# Patient Record
Sex: MALE | Race: WHITE | NOT HISPANIC OR LATINO | Employment: UNEMPLOYED | URBAN - METROPOLITAN AREA
[De-identification: names, ages, dates, MRNs, and addresses within clinical notes are randomized per-mention and may not be internally consistent; named-entity substitution may affect disease eponyms.]

---

## 2023-04-17 ENCOUNTER — TELEPHONE (OUTPATIENT)
Dept: PRIMARY CARE | Facility: CLINIC | Age: 58
End: 2023-04-17
Payer: MEDICAID

## 2023-04-17 DIAGNOSIS — J01.90 ACUTE NON-RECURRENT SINUSITIS, UNSPECIFIED LOCATION: Primary | ICD-10-CM

## 2023-04-17 RX ORDER — DOXYCYCLINE 100 MG/1
100 CAPSULE ORAL 2 TIMES DAILY
Qty: 14 CAPSULE | Refills: 0 | Status: SHIPPED | OUTPATIENT
Start: 2023-04-17 | End: 2023-04-24

## 2023-04-17 RX ORDER — AMOXICILLIN 875 MG/1
875 TABLET, FILM COATED ORAL 2 TIMES DAILY
Qty: 20 TABLET | Refills: 0 | Status: SHIPPED | OUTPATIENT
Start: 2023-04-17 | End: 2023-04-27

## 2023-04-17 NOTE — TELEPHONE ENCOUNTER
OK- I sent him in Doxycycline.  Please be sure he triple checks this.  Unfortunately I cannot see urgent care records anymore.

## 2023-04-17 NOTE — TELEPHONE ENCOUNTER
Patient was in the UC on Sunday for a sinus infection down stairs. Patient was prescribed an antibiotic and woke up with swollen lips. Can something else be called in for him?

## 2023-04-17 NOTE — TELEPHONE ENCOUNTER
Patient went to  prescription at the pharmacy and they had the same prescription and he is having an allergic reaction to that medication. Please call when new prescription is called into pharmacy - [facility].

## 2023-04-17 NOTE — TELEPHONE ENCOUNTER
Patient seen at  Urgent care 4/16 for sinus infection. States he was prescribed Doxycyline Hyclate 100 mg. States when he woke up this morning, his lips were swollen and he is pretty sure it was from the medication.    Wants to know if you could prescribe a different medication.      Please advise..

## 2023-04-17 NOTE — TELEPHONE ENCOUNTER
I was told he was on doxycycline twice daily.  I sent him in Amoxicillin.  This is not the same medication- please clarify.  Thanks.

## 2023-05-02 ENCOUNTER — TELEPHONE (OUTPATIENT)
Dept: PRIMARY CARE | Facility: CLINIC | Age: 58
End: 2023-05-02
Payer: MEDICAID

## 2023-05-02 NOTE — TELEPHONE ENCOUNTER
Patient states that his sinus infection that was treated a couple of weeks ago, has never totally cleared up and is back again. Patient has congestion and swelling in face with headache. [facility]acy [facility] [place]. Patient thinks that the antibiotics from Urgent care and was prescribed by , didn't totally work. Can  Call in something stronger?

## 2023-05-03 ENCOUNTER — OFFICE VISIT (OUTPATIENT)
Dept: PRIMARY CARE | Facility: CLINIC | Age: 58
End: 2023-05-03
Payer: MEDICAID

## 2023-05-03 VITALS
HEART RATE: 68 BPM | SYSTOLIC BLOOD PRESSURE: 156 MMHG | TEMPERATURE: 98.2 F | OXYGEN SATURATION: 97 % | HEIGHT: 67 IN | BODY MASS INDEX: 30.23 KG/M2 | DIASTOLIC BLOOD PRESSURE: 77 MMHG | WEIGHT: 192.6 LBS

## 2023-05-03 DIAGNOSIS — J01.01 ACUTE RECURRENT MAXILLARY SINUSITIS: ICD-10-CM

## 2023-05-03 DIAGNOSIS — Z00.00 HEALTHCARE MAINTENANCE: Primary | ICD-10-CM

## 2023-05-03 PROBLEM — R09.81 NASAL CONGESTION: Status: ACTIVE | Noted: 2023-05-03

## 2023-05-03 PROBLEM — I10 BENIGN ESSENTIAL HYPERTENSION: Status: ACTIVE | Noted: 2023-05-03

## 2023-05-03 PROBLEM — J01.90 ACUTE SINUSITIS: Status: ACTIVE | Noted: 2023-05-03

## 2023-05-03 PROBLEM — R05.9 COUGH: Status: ACTIVE | Noted: 2023-05-03

## 2023-05-03 PROBLEM — J06.9 UPPER RESPIRATORY INFECTION, ACUTE: Status: ACTIVE | Noted: 2023-05-03

## 2023-05-03 PROBLEM — S39.012A LUMBAR STRAIN: Status: ACTIVE | Noted: 2023-05-03

## 2023-05-03 PROBLEM — R42 DIZZINESS, NONSPECIFIC: Status: ACTIVE | Noted: 2023-05-03

## 2023-05-03 PROBLEM — R73.9 HYPERGLYCEMIA: Status: ACTIVE | Noted: 2023-05-03

## 2023-05-03 PROBLEM — M70.50 KNEE BURSITIS: Status: ACTIVE | Noted: 2023-05-03

## 2023-05-03 PROBLEM — J31.0 RHINITIS: Status: ACTIVE | Noted: 2023-05-03

## 2023-05-03 PROBLEM — B34.9 NONSPECIFIC SYNDROME SUGGESTIVE OF VIRAL ILLNESS: Status: ACTIVE | Noted: 2023-05-03

## 2023-05-03 PROBLEM — B00.1 COLD SORE: Status: ACTIVE | Noted: 2023-05-03

## 2023-05-03 PROCEDURE — 93000 ELECTROCARDIOGRAM COMPLETE: CPT | Performed by: STUDENT IN AN ORGANIZED HEALTH CARE EDUCATION/TRAINING PROGRAM

## 2023-05-03 PROCEDURE — 99213 OFFICE O/P EST LOW 20 MIN: CPT | Performed by: STUDENT IN AN ORGANIZED HEALTH CARE EDUCATION/TRAINING PROGRAM

## 2023-05-03 PROCEDURE — 3077F SYST BP >= 140 MM HG: CPT | Performed by: STUDENT IN AN ORGANIZED HEALTH CARE EDUCATION/TRAINING PROGRAM

## 2023-05-03 PROCEDURE — 3078F DIAST BP <80 MM HG: CPT | Performed by: STUDENT IN AN ORGANIZED HEALTH CARE EDUCATION/TRAINING PROGRAM

## 2023-05-03 PROCEDURE — 1036F TOBACCO NON-USER: CPT | Performed by: STUDENT IN AN ORGANIZED HEALTH CARE EDUCATION/TRAINING PROGRAM

## 2023-05-03 RX ORDER — METOPROLOL SUCCINATE 50 MG/1
50 TABLET, EXTENDED RELEASE ORAL DAILY
COMMUNITY
End: 2023-05-12

## 2023-05-03 RX ORDER — DOXYCYCLINE HYCLATE 100 MG
100 TABLET ORAL EVERY 12 HOURS
COMMUNITY
Start: 2022-03-22 | End: 2023-07-11 | Stop reason: ALTCHOICE

## 2023-05-03 RX ORDER — LISINOPRIL AND HYDROCHLOROTHIAZIDE 12.5; 2 MG/1; MG/1
1 TABLET ORAL DAILY
COMMUNITY
End: 2023-05-12

## 2023-05-03 RX ORDER — LEVOFLOXACIN 500 MG/1
500 TABLET, FILM COATED ORAL DAILY
Qty: 7 TABLET | Refills: 0 | Status: SHIPPED | OUTPATIENT
Start: 2023-05-03 | End: 2023-05-10

## 2023-05-03 RX ORDER — PREDNISONE 10 MG/1
10 TABLET ORAL
COMMUNITY
Start: 2022-03-24 | End: 2023-07-11 | Stop reason: ALTCHOICE

## 2023-05-03 RX ORDER — FLUTICASONE PROPIONATE 50 MCG
1 SPRAY, SUSPENSION (ML) NASAL 2 TIMES DAILY
COMMUNITY
Start: 2013-10-17

## 2023-05-03 ASSESSMENT — ENCOUNTER SYMPTOMS
SINUS PAIN: 1
DIAPHORESIS: 0
OCCASIONAL FEELINGS OF UNSTEADINESS: 0
RHINORRHEA: 0
SORE THROAT: 0
NAUSEA: 0
DYSURIA: 0
VOMITING: 0
DIZZINESS: 0
FEVER: 0
LOSS OF SENSATION IN FEET: 0
TROUBLE SWALLOWING: 0
LIGHT-HEADEDNESS: 0
SHORTNESS OF BREATH: 0
DEPRESSION: 0
SINUS PRESSURE: 1
CHILLS: 1

## 2023-05-03 ASSESSMENT — PATIENT HEALTH QUESTIONNAIRE - PHQ9
1. LITTLE INTEREST OR PLEASURE IN DOING THINGS: NOT AT ALL
2. FEELING DOWN, DEPRESSED OR HOPELESS: NOT AT ALL
SUM OF ALL RESPONSES TO PHQ9 QUESTIONS 1 AND 2: 0

## 2023-05-03 NOTE — PROGRESS NOTES
"  Subjective   Patient ID: Roger An is a 57 y.o. male who presents for Sinus Problem.  He is here for sinusitis. He recently had course of doxycycline and had some improvement but symptoms recurred. He is still having pain underneath the right eye, fullness in face. Ear popping sensation and sinus pressure and frontal headache. He's been using flonase, mucinex, and drinking water. He has a nasal saline rinse and this seems to make it worse.         Review of Systems   Constitutional:  Positive for chills. Negative for diaphoresis and fever.   HENT:  Positive for sinus pressure and sinus pain. Negative for ear pain, hearing loss, postnasal drip (minimal), rhinorrhea, sore throat and trouble swallowing.    Eyes:  Negative for visual disturbance.   Respiratory:  Negative for shortness of breath.    Cardiovascular:  Negative for chest pain.   Gastrointestinal:  Negative for nausea and vomiting.   Genitourinary:  Negative for dysuria.   Skin:  Negative for rash.   Neurological:  Negative for dizziness and light-headedness.       /77   Pulse 68   Temp 36.8 °C (98.2 °F)   Ht 1.708 m (5' 7.25\")   Wt 87.4 kg (192 lb 9.6 oz)   SpO2 97%   BMI 29.94 kg/m²     Objective   Physical Exam  Vitals reviewed.   Constitutional:       General: He is not in acute distress.     Appearance: Normal appearance.   HENT:      Head: Normocephalic.      Comments: Right maxillary sinus tenderness present.     Right Ear: Tympanic membrane, ear canal and external ear normal. There is no impacted cerumen.      Left Ear: Tympanic membrane, ear canal and external ear normal. There is no impacted cerumen.      Mouth/Throat:      Mouth: Mucous membranes are moist.      Pharynx: Oropharynx is clear. No oropharyngeal exudate or posterior oropharyngeal erythema.   Cardiovascular:      Rate and Rhythm: Normal rate and regular rhythm.   Pulmonary:      Effort: Pulmonary effort is normal. No respiratory distress.      Breath sounds: Normal " breath sounds.   Abdominal:      General: There is no distension.   Musculoskeletal:         General: No deformity.      Cervical back: Neck supple. No tenderness.   Lymphadenopathy:      Cervical: No cervical adenopathy.   Skin:     Coloration: Skin is not jaundiced.   Neurological:      Mental Status: He is alert.         Assessment/Plan   Problem List Items Addressed This Visit          Infectious/Inflammatory    Acute sinusitis     QTc in office today is 408.  Symptoms seem most consistent with acute sinusitis.  Given some improvement with doxycycline but then symptoms returned we will treat with another course of a different class of antibiotics.  Given the appropriate QTc will prescribe a course of Levaquin for 7 days.  Advised no stretching or exercise while taking this to reduce risk of tendinopathy.  Also ordered CT of the sinuses given the mild tenderness present on exam and failed course of antibiotics and advised patient to get if the second course of antibiotics is not improved.  Continue supportive therapies. advised to let us know symptoms worsen as we can refer him to ENT if needed.  Follow-up with me as needed.  Follow-up with Dr. Hayward as previously scheduled.         Relevant Medications    levoFLOXacin (Levaquin) 500 mg tablet    Other Relevant Orders    CT sinus wo IV contrast     Other Visit Diagnoses       Healthcare maintenance    -  Primary    Relevant Orders    Follow Up In [facility] - PCP    ECG 12 lead (Clinic Performed) (Completed)

## 2023-05-03 NOTE — ASSESSMENT & PLAN NOTE
QTc in office today is 408.  Symptoms seem most consistent with acute sinusitis.  Given some improvement with doxycycline but then symptoms returned we will treat with another course of a different class of antibiotics.  Given the appropriate QTc will prescribe a course of Levaquin for 7 days.  Advised no stretching or exercise while taking this to reduce risk of tendinopathy.  Also ordered CT of the sinuses given the mild tenderness present on exam and failed course of antibiotics and advised patient to get if the second course of antibiotics is not improved.  Continue supportive therapies. advised to let us know symptoms worsen as we can refer him to ENT if needed.  Follow-up with me as needed.  Follow-up with Dr. Hayward as previously scheduled.

## 2023-05-12 DIAGNOSIS — I10 ESSENTIAL (PRIMARY) HYPERTENSION: ICD-10-CM

## 2023-05-12 RX ORDER — METOPROLOL SUCCINATE 50 MG/1
TABLET, EXTENDED RELEASE ORAL
Qty: 90 TABLET | Refills: 2 | Status: SHIPPED | OUTPATIENT
Start: 2023-05-12 | End: 2023-07-11 | Stop reason: SDUPTHER

## 2023-05-12 RX ORDER — LISINOPRIL AND HYDROCHLOROTHIAZIDE 12.5; 2 MG/1; MG/1
TABLET ORAL
Qty: 90 TABLET | Refills: 2 | Status: SHIPPED | OUTPATIENT
Start: 2023-05-12 | End: 2023-07-11

## 2023-07-11 ENCOUNTER — OFFICE VISIT (OUTPATIENT)
Dept: PRIMARY CARE | Facility: CLINIC | Age: 58
End: 2023-07-11
Payer: MEDICAID

## 2023-07-11 VITALS
BODY MASS INDEX: 29.98 KG/M2 | SYSTOLIC BLOOD PRESSURE: 158 MMHG | WEIGHT: 191 LBS | OXYGEN SATURATION: 95 % | DIASTOLIC BLOOD PRESSURE: 80 MMHG | HEART RATE: 78 BPM | RESPIRATION RATE: 16 BRPM | TEMPERATURE: 97.3 F | HEIGHT: 67 IN

## 2023-07-11 DIAGNOSIS — Z00.00 HEALTHCARE MAINTENANCE: ICD-10-CM

## 2023-07-11 DIAGNOSIS — Z12.11 COLON CANCER SCREENING: Primary | ICD-10-CM

## 2023-07-11 DIAGNOSIS — I10 ESSENTIAL (PRIMARY) HYPERTENSION: ICD-10-CM

## 2023-07-11 PROCEDURE — 3077F SYST BP >= 140 MM HG: CPT | Performed by: INTERNAL MEDICINE

## 2023-07-11 PROCEDURE — 3079F DIAST BP 80-89 MM HG: CPT | Performed by: INTERNAL MEDICINE

## 2023-07-11 PROCEDURE — 1036F TOBACCO NON-USER: CPT | Performed by: INTERNAL MEDICINE

## 2023-07-11 PROCEDURE — 99396 PREV VISIT EST AGE 40-64: CPT | Performed by: INTERNAL MEDICINE

## 2023-07-11 RX ORDER — METOPROLOL SUCCINATE 50 MG/1
50 TABLET, EXTENDED RELEASE ORAL DAILY
Qty: 90 TABLET | Refills: 2 | Status: SHIPPED | OUTPATIENT
Start: 2023-07-11

## 2023-07-11 RX ORDER — LISINOPRIL AND HYDROCHLOROTHIAZIDE 20; 25 MG/1; MG/1
1 TABLET ORAL DAILY
Qty: 30 TABLET | Refills: 11 | Status: SHIPPED | OUTPATIENT
Start: 2023-07-11 | End: 2023-08-05

## 2023-07-11 ASSESSMENT — PATIENT HEALTH QUESTIONNAIRE - PHQ9
2. FEELING DOWN, DEPRESSED OR HOPELESS: NOT AT ALL
SUM OF ALL RESPONSES TO PHQ9 QUESTIONS 1 AND 2: 0
1. LITTLE INTEREST OR PLEASURE IN DOING THINGS: NOT AT ALL

## 2023-07-11 ASSESSMENT — ENCOUNTER SYMPTOMS
SLEEP DISTURBANCE: 0
FREQUENCY: 0
DIARRHEA: 0
CONSTIPATION: 0

## 2023-07-11 NOTE — PROGRESS NOTES
Patient here for a physical     Subjective   Patient ID: Roger An is a 58 y.o. male who presents for Annual Exam.    The patient reports a recent reaction to Augmentin and experienced lip swelling.  He has taken amoxicillin as well as cefdinir in the past but does not recall adverse reactions at the time.  Nevertheless, he has recovered from the episode of acute sinusitis and was successfully treated with doxycycline.    The patient has a sphygmomanometer at home which he has had for some time.  He is currently maintained with metoprolol 50 mg XL, and lisinopril-hydrochlorothiazide 20-12.5 mg every day. His blood pressure in the office today was 158/80 mmHg, and he notes that he has been eating more salt than his baseline recently.    The patient denies any vision changes and reports good sleep quality.  He denies any bowel problems or urinary symptoms.      The patient will be getting  in 10/2024.        Review of Systems   Eyes:  Negative for visual disturbance.   Gastrointestinal:  Negative for constipation and diarrhea.   Genitourinary:  Negative for frequency.   Psychiatric/Behavioral:  Negative for sleep disturbance.      Objective   Physical Exam  Constitutional:       Appearance: Normal appearance.   Cardiovascular:      Rate and Rhythm: Normal rate and regular rhythm.      Heart sounds: Normal heart sounds.      Comments: No carotid bruit bilaterally.  Pulmonary:      Effort: Pulmonary effort is normal.      Breath sounds: Normal breath sounds.   Abdominal:      General: Bowel sounds are normal.      Palpations: Abdomen is soft.      Tenderness: There is no abdominal tenderness.   Skin:     General: Skin is warm and dry.   Neurological:      General: No focal deficit present.      Mental Status: He is alert and oriented to person, place, and time. Mental status is at baseline.   Psychiatric:         Mood and Affect: Mood normal.         Behavior: Behavior normal.         Assessment/Plan    Problem List Items Addressed This Visit    None  Visit Diagnoses       Colon cancer screening    -  Primary    Relevant Orders    Cologuard® colon cancer screening    Healthcare maintenance        Relevant Orders    Lipid panel    Comprehensive metabolic panel    CBC and Auto Differential    PSA    Essential (primary) hypertension        Relevant Medications    metoprolol succinate XL (Toprol-XL) 50 mg 24 hr tablet    lisinopriL-hydrochlorothiazide 20-25 mg tablet    Other Relevant Orders    Follow Up In [facility] - Pharmacy            CPE Performed  -  Discussed healthy diet and regular exercise.    -  Physical exam overall unremarkable. Immunizations reviewed and updated accordingly. Healthy lifestyle choices discussed (tobacco avoidance, appropriate alcohol use, avoidance of illicit substances).   -  Patient is wearing seatbelt.   -  Screening lab work ordered as indicated.    -  Age appropriate screening tests reviewed with patient.        IMPRESSIONS/PLAN:    HTN  - /80 in the office today, and similar on repeat. Continue lisinopril-HCTZ to 20-12.5 mg every day and metoprolol 50 mg XL every day for now.  Advised the patient to call the clinic with any dizziness on standing or lightheadedness.  Advised the patient to cut down on dietary salt.  Ordered referral to Pharmacy in [facility] for sphygmomanometer calibration and BP monitoring.      Health Maintenance  - Routine labs ordered including CBC, CMP, and a lipid panel to be completed in the fasting state.  Last PSA wnl 7/2020.  Last Cologuard 5/2019, ordered repeat for 2023.  Advised the patient to receive his Shingrix series vaccine through the pharmacy, and discussed adverse effects.    Follow-up according to routine health maintenance, call sooner if needed.     Scribe Attestation  By signing my name below, IBrianna Scribe   attest that this documentation has been prepared under the direction and in the presence of  Ford Hayward DO.

## 2023-08-04 DIAGNOSIS — I10 ESSENTIAL (PRIMARY) HYPERTENSION: ICD-10-CM

## 2023-08-05 RX ORDER — LISINOPRIL AND HYDROCHLOROTHIAZIDE 20; 25 MG/1; MG/1
1 TABLET ORAL DAILY
Qty: 30 TABLET | Refills: 1 | Status: SHIPPED | OUTPATIENT
Start: 2023-08-05 | End: 2023-08-21

## 2023-08-19 DIAGNOSIS — I10 ESSENTIAL (PRIMARY) HYPERTENSION: ICD-10-CM

## 2023-08-21 RX ORDER — LISINOPRIL AND HYDROCHLOROTHIAZIDE 20; 25 MG/1; MG/1
1 TABLET ORAL DAILY
Qty: 90 TABLET | Refills: 3 | Status: SHIPPED | OUTPATIENT
Start: 2023-08-21 | End: 2024-08-20

## 2024-07-11 ENCOUNTER — APPOINTMENT (OUTPATIENT)
Dept: PRIMARY CARE | Facility: CLINIC | Age: 59
End: 2024-07-11
Payer: MEDICAID

## 2024-07-11 VITALS
HEIGHT: 67 IN | WEIGHT: 191 LBS | RESPIRATION RATE: 16 BRPM | DIASTOLIC BLOOD PRESSURE: 80 MMHG | OXYGEN SATURATION: 98 % | SYSTOLIC BLOOD PRESSURE: 138 MMHG | HEART RATE: 66 BPM | BODY MASS INDEX: 29.98 KG/M2 | TEMPERATURE: 97.7 F

## 2024-07-11 DIAGNOSIS — I10 ESSENTIAL (PRIMARY) HYPERTENSION: Primary | ICD-10-CM

## 2024-07-11 DIAGNOSIS — Z00.00 HEALTHCARE MAINTENANCE: ICD-10-CM

## 2024-07-11 DIAGNOSIS — Z12.11 COLON CANCER SCREENING: ICD-10-CM

## 2024-07-11 PROCEDURE — 1036F TOBACCO NON-USER: CPT | Performed by: INTERNAL MEDICINE

## 2024-07-11 PROCEDURE — 93000 ELECTROCARDIOGRAM COMPLETE: CPT | Performed by: INTERNAL MEDICINE

## 2024-07-11 PROCEDURE — 3075F SYST BP GE 130 - 139MM HG: CPT | Performed by: INTERNAL MEDICINE

## 2024-07-11 PROCEDURE — 3079F DIAST BP 80-89 MM HG: CPT | Performed by: INTERNAL MEDICINE

## 2024-07-11 PROCEDURE — 99396 PREV VISIT EST AGE 40-64: CPT | Performed by: INTERNAL MEDICINE

## 2024-07-11 RX ORDER — LISINOPRIL AND HYDROCHLOROTHIAZIDE 20; 25 MG/1; MG/1
1 TABLET ORAL DAILY
Qty: 90 TABLET | Refills: 3 | Status: SHIPPED | OUTPATIENT
Start: 2024-07-11 | End: 2025-07-11

## 2024-07-11 RX ORDER — METOPROLOL SUCCINATE 50 MG/1
50 TABLET, EXTENDED RELEASE ORAL DAILY
Qty: 90 TABLET | Refills: 3 | Status: SHIPPED | OUTPATIENT
Start: 2024-07-11

## 2024-07-11 ASSESSMENT — PATIENT HEALTH QUESTIONNAIRE - PHQ9
SUM OF ALL RESPONSES TO PHQ9 QUESTIONS 1 AND 2: 0
1. LITTLE INTEREST OR PLEASURE IN DOING THINGS: NOT AT ALL
2. FEELING DOWN, DEPRESSED OR HOPELESS: NOT AT ALL

## 2024-07-11 ASSESSMENT — ENCOUNTER SYMPTOMS
CONSTIPATION: 0
EYE DISCHARGE: 1
SLEEP DISTURBANCE: 0
DIARRHEA: 0
FREQUENCY: 0
ABDOMINAL PAIN: 0

## 2024-07-11 NOTE — PROGRESS NOTES
Patient here for a physical     Subjective   Patient ID: Roger An is a 59 y.o. male who presents for Annual Exam.  He is generally doing well today, and has no complaints.    The patient reports eye tearing associated with seasonal allergies.  He believes that this was precipitated by mowing his lawn.  The patient otherwise denies any vision changes.     The patient denies any hearing impairment and reports good sleep quality.  He denies any abdominal pain, hematochezia, melena, bowel problems, nocturia, weakening of the urinary stream, or other urinary symptoms.    The patient's Sister recently had a myocardial infarction which was treated with the placement of five stents.      Review of Systems   HENT:  Negative for hearing loss.    Eyes:  Positive for discharge. Negative for visual disturbance.   Gastrointestinal:  Negative for abdominal pain, constipation and diarrhea.   Genitourinary:  Negative for frequency.        Negative for nocturia or weakening of the urinary stream.   Psychiatric/Behavioral:  Negative for sleep disturbance.        Objective   Physical Exam  Constitutional:       Appearance: Normal appearance.   Neck:      Vascular: No carotid bruit.   Cardiovascular:      Rate and Rhythm: Normal rate and regular rhythm.      Heart sounds: Normal heart sounds.   Pulmonary:      Effort: Pulmonary effort is normal.      Breath sounds: Normal breath sounds.   Abdominal:      General: Bowel sounds are normal.      Palpations: Abdomen is soft.      Tenderness: There is no abdominal tenderness.   Skin:     General: Skin is warm and dry.   Neurological:      General: No focal deficit present.      Mental Status: He is alert and oriented to person, place, and time. Mental status is at baseline.   Psychiatric:         Mood and Affect: Mood normal.         Behavior: Behavior normal.         Assessment/Plan   Problem List Items Addressed This Visit    None  Visit Diagnoses         Codes    Essential (primary)  hypertension    -  Primary I10    Relevant Medications    lisinopriL-hydrochlorothiazide 20-25 mg tablet    metoprolol succinate XL (Toprol-XL) 50 mg 24 hr tablet    Other Relevant Orders    ECG 12 lead (Clinic Performed)    Healthcare maintenance     Z00.00    Relevant Orders    Lipid panel    Comprehensive metabolic panel    CBC and Auto Differential    PSA    Colon cancer screening     Z12.11    Relevant Orders    Cologuard® colon cancer screening            CPE Performed  -  Discussed healthy diet and regular exercise.    -  Physical exam overall unremarkable. Immunizations reviewed and updated accordingly. Healthy lifestyle choices discussed (tobacco avoidance, appropriate alcohol use, avoidance of illicit substances).   -  Patient is wearing seatbelt.   -  Screening lab work ordered as indicated.    -  Age appropriate screening tests reviewed with patient.        IMPRESSIONS/PLAN:     HTN  - /80 in the office today, and similar on repeat. Continue lisinopril-HCTZ to 20-12.5 mg every day and metoprolol 50 mg XL every day for now.  Advised the patient to call the clinic with any dizziness on standing or lightheadedness.  Advised the patient to cut down on dietary salt. Recommended CT Cardiac Score given family history, and patient will consider for now.      Health Maintenance  - Routine labs previously ordered including CBC, CMP, and a lipid panel to be completed in the fasting state.  Last PSA wnl 7/2020.  Last Cologuard 5/2019, ordered repeat Cologuard for 2024.  Advised the patient to receive the Shingrix series through the pharmacy, and discussed adverse effects.       Follow-up according to routine health maintenance, call sooner if needed.       Scribe Attestation  By signing my name below, I, Madhuri Cuellar   attest that this documentation has been prepared under the direction and in the presence of Ford Hayward DO.   Brianna Ruvalcaba 07/11/24 3:32 PM

## 2024-08-19 ENCOUNTER — TELEMEDICINE (OUTPATIENT)
Dept: PRIMARY CARE | Facility: CLINIC | Age: 59
End: 2024-08-19
Payer: MEDICAID

## 2024-08-19 ENCOUNTER — TELEPHONE (OUTPATIENT)
Dept: PRIMARY CARE | Facility: CLINIC | Age: 59
End: 2024-08-19

## 2024-08-19 DIAGNOSIS — J01.00 ACUTE MAXILLARY SINUSITIS, RECURRENCE NOT SPECIFIED: Primary | ICD-10-CM

## 2024-08-19 PROCEDURE — 1036F TOBACCO NON-USER: CPT | Performed by: INTERNAL MEDICINE

## 2024-08-19 PROCEDURE — 99213 OFFICE O/P EST LOW 20 MIN: CPT | Performed by: INTERNAL MEDICINE

## 2024-08-19 RX ORDER — DOXYCYCLINE 100 MG/1
100 CAPSULE ORAL 2 TIMES DAILY
Qty: 20 CAPSULE | Refills: 0 | Status: SHIPPED | OUTPATIENT
Start: 2024-08-19 | End: 2024-08-29

## 2024-08-19 ASSESSMENT — ENCOUNTER SYMPTOMS
SINUS PRESSURE: 1
RHINORRHEA: 1

## 2024-08-19 NOTE — TELEPHONE ENCOUNTER
Pt has a sinus infection and unable to come in at 10;30. Pt advised he is covid negative. Pt is available to do a virtual today at 4:30.please call pt and advise

## 2024-08-19 NOTE — PROGRESS NOTES
Patient doing a virtual visit for a URI. Negative COVID   Virtual or Telephone Consent    An interactive audio and video telecommunication system which permits real time communications between the patient (at the originating site) and provider (at the distant site) was utilized to provide this telehealth service.   Verbal consent was requested and obtained from Roger An on this date, 08/19/24 for a telehealth visit.      Subjective   Patient ID: Roger An is a 59 y.o. male who presents for URI.    The patient reports notes onset of sneezing, rhinorrhea, with onset on 8/16/2024 while working in his yard.  This is associated with nasal congestion and postnasal drip  This has progressed to bilateral frontal headache along with periorbital swelling and tenderness.  The patient has been taking Flonase, Mucinex, and Aspirin with partial benefit.  He is allergic to penicillins.      Review of Systems   HENT:  Positive for congestion, postnasal drip, rhinorrhea, sinus pressure and sneezing.    Eyes:         Positive for watery eyes, and periorbital swelling.       Objective   Physical Exam  Physical examination not done.    Assessment/Plan   Problem List Items Addressed This Visit             ICD-10-CM    Acute sinusitis - Primary J01.90    Relevant Medications    doxycycline (Vibramycin) 100 mg capsule       IMPRESSIONS/PLAN:     Acute Sinusitis  - Prescribed doxycycline 100 mg BID to be taken with food, and avoid direct exposure to sunlight.  Advised patient to continue with Flonase and Mucinex as directed on packaging.  Drink plenty of water to remain well hydrated, and rest as much as possible.  Call the clinic if symptoms persist or worsen.     HTN  -Continue lisinopril-HCTZ to 20-12.5 mg every day and metoprolol 50 mg XL every day for now.  Advised the patient to call the clinic with any dizziness on standing or lightheadedness.  Advised the patient to cut down on dietary salt. Recommended CT Cardiac Score  given family history, and patient will consider for now.      Health Maintenance  - Routine labs previously ordered including CBC, CMP, and a lipid panel to be completed in the fasting state.  Last PSA wnl 7/2020.  Last Cologuard 5/2019, ordered repeat Cologuard for 2024.  Advised the patient to receive the Shingrix series through the pharmacy, and discussed adverse effects.       Follow-up according to routine health maintenance, call sooner if needed.       Scribe Attestation  By signing my name below, I, Madhuri Cuellar   attest that this documentation has been prepared under the direction and in the presence of Ford Hayward DO.   Brianna Ruvalcaba 08/19/24 4:24 PM

## 2024-09-05 ENCOUNTER — TELEPHONE (OUTPATIENT)
Dept: PRIMARY CARE | Facility: CLINIC | Age: 59
End: 2024-09-05
Payer: MEDICAID

## 2024-09-05 NOTE — TELEPHONE ENCOUNTER
Pt called stated he tested positive Tuesday night, pt taking over the counter medication, , stated fever broke, stated feeling better then Tuesday.  Just didn't know what else do?    Please call pt at  [phone]

## 2025-03-03 ENCOUNTER — OFFICE VISIT (OUTPATIENT)
Dept: PRIMARY CARE | Facility: CLINIC | Age: 60
End: 2025-03-03
Payer: MEDICAID

## 2025-03-03 VITALS
TEMPERATURE: 97.8 F | DIASTOLIC BLOOD PRESSURE: 80 MMHG | WEIGHT: 192 LBS | SYSTOLIC BLOOD PRESSURE: 148 MMHG | BODY MASS INDEX: 30.53 KG/M2 | RESPIRATION RATE: 16 BRPM | HEART RATE: 86 BPM | OXYGEN SATURATION: 98 %

## 2025-03-03 DIAGNOSIS — J01.90 ACUTE NON-RECURRENT SINUSITIS, UNSPECIFIED LOCATION: Primary | ICD-10-CM

## 2025-03-03 PROCEDURE — 1036F TOBACCO NON-USER: CPT | Performed by: INTERNAL MEDICINE

## 2025-03-03 PROCEDURE — 3079F DIAST BP 80-89 MM HG: CPT | Performed by: INTERNAL MEDICINE

## 2025-03-03 PROCEDURE — 3077F SYST BP >= 140 MM HG: CPT | Performed by: INTERNAL MEDICINE

## 2025-03-03 PROCEDURE — 99214 OFFICE O/P EST MOD 30 MIN: CPT | Performed by: INTERNAL MEDICINE

## 2025-03-03 RX ORDER — DOXYCYCLINE 100 MG/1
100 CAPSULE ORAL 2 TIMES DAILY
Qty: 20 CAPSULE | Refills: 0 | Status: SHIPPED | OUTPATIENT
Start: 2025-03-03 | End: 2025-03-13

## 2025-03-03 ASSESSMENT — ENCOUNTER SYMPTOMS
SINUS PRESSURE: 1
COUGH: 1

## 2025-03-03 NOTE — PROGRESS NOTES
Patient here for a URI - cough, congestion     Subjective   Patient ID: Roger An is a 59 y.o. male who presents for URI.    The patient reports onset of a flu-like condition around 2/21/2025.  Symptoms initially consisted of fever, nausea, vomiting, and diarrhea all occurring for one day before subsiding.  Since then, the patient has been experiencing nasal and sinus congestion with greenish drainage in the morning.  This is associated with chest congestion and a moderate cough at night time which occasionally prevents him from sleep.  The patient has been taking Mucinex, and finds that this is helping.  He mentions an allergy to Augmentin.    URI   Associated symptoms include congestion and coughing.     Review of Systems   HENT:  Positive for congestion and sinus pressure.    Respiratory:  Positive for cough.         Positive for chest congestion.     Objective   Physical Exam  Constitutional:       Appearance: Normal appearance.   Neck:      Vascular: No carotid bruit.   Cardiovascular:      Rate and Rhythm: Normal rate and regular rhythm.      Heart sounds: Normal heart sounds.   Pulmonary:      Effort: Pulmonary effort is normal.      Breath sounds: Normal breath sounds.   Abdominal:      General: Bowel sounds are normal.      Palpations: Abdomen is soft.      Tenderness: There is no abdominal tenderness.   Skin:     General: Skin is warm and dry.   Neurological:      General: No focal deficit present.      Mental Status: He is alert and oriented to person, place, and time. Mental status is at baseline.   Psychiatric:         Mood and Affect: Mood normal.         Behavior: Behavior normal.         Assessment/Plan   Problem List Items Addressed This Visit             ICD-10-CM    Acute sinusitis - Primary J01.90    Relevant Medications    doxycycline (Vibramycin) 100 mg capsule       IMPRESSIONS/PLAN:     Acute Sinusitis  - Patient notes allergy to Augmentin.  Prescribed doxycycline 100 mg BID for 10 days  to be taken with food, and avoid direct exposure to sunlight.  Recommended patient take medication with food to avoid adverse effects.  Advised patient to continue with Mucinex as directed on packaging.  Drink plenty of water to remain well hydrated, and rest as much as possible.  Call the clinic if symptoms persist or worsen.      HTN  -Continue lisinopril-HCTZ to 20-12.5 mg every day and metoprolol 50 mg XL every day for now.  Advised the patient to call the clinic with any dizziness on standing or lightheadedness.  Advised the patient to cut down on dietary salt. Recommended CT Cardiac Score given family history, and patient will consider for now.      Health Maintenance  - Routine labs previously ordered including CBC, CMP, and a lipid panel to be completed in the fasting state.  Last PSA wnl 7/2020.  Last Cologuard 5/2019, ordered repeat Cologuard for 2024.  Advised the patient to receive the Shingrix series through the pharmacy, and discussed adverse effects.       Follow-up according to routine health maintenance, call sooner if needed.       Scribe Attestation  By signing my name below, I, Madhuri Cuellar   attest that this documentation has been prepared under the direction and in the presence of Ford Hayward DO.   Brianna Ruvalcaba 03/03/25 4:33 PM

## 2025-08-04 DIAGNOSIS — I10 ESSENTIAL (PRIMARY) HYPERTENSION: ICD-10-CM

## 2025-08-04 RX ORDER — METOPROLOL SUCCINATE 50 MG/1
50 TABLET, EXTENDED RELEASE ORAL DAILY
Qty: 90 TABLET | Refills: 3 | Status: SHIPPED | OUTPATIENT
Start: 2025-08-04

## 2025-08-04 RX ORDER — LISINOPRIL AND HYDROCHLOROTHIAZIDE 20; 25 MG/1; MG/1
1 TABLET ORAL DAILY
Qty: 90 TABLET | Refills: 3 | Status: SHIPPED | OUTPATIENT
Start: 2025-08-04